# Patient Record
Sex: MALE | Race: WHITE | NOT HISPANIC OR LATINO | Employment: UNEMPLOYED | URBAN - METROPOLITAN AREA
[De-identification: names, ages, dates, MRNs, and addresses within clinical notes are randomized per-mention and may not be internally consistent; named-entity substitution may affect disease eponyms.]

---

## 2022-01-27 ENCOUNTER — HOSPITAL ENCOUNTER (INPATIENT)
Facility: CLINIC | Age: 32
LOS: 1 days | Discharge: LEFT BEFORE DISCHARGE | End: 2022-01-28
Attending: EMERGENCY MEDICINE | Admitting: INTERNAL MEDICINE
Payer: COMMERCIAL

## 2022-01-27 DIAGNOSIS — F10.931 ALCOHOL WITHDRAWAL, WITH DELIRIUM (H): ICD-10-CM

## 2022-01-27 DIAGNOSIS — K70.10 ALCOHOLIC HEPATITIS WITHOUT ASCITES (H): ICD-10-CM

## 2022-01-27 DIAGNOSIS — E86.0 DEHYDRATION: ICD-10-CM

## 2022-01-27 LAB
ALBUMIN SERPL-MCNC: 4.7 G/DL (ref 3.4–5)
ALP SERPL-CCNC: 112 U/L (ref 40–150)
ALT SERPL W P-5'-P-CCNC: 212 U/L (ref 0–70)
ANION GAP SERPL CALCULATED.3IONS-SCNC: 18 MMOL/L (ref 3–14)
AST SERPL W P-5'-P-CCNC: 239 U/L (ref 0–45)
BASOPHILS # BLD MANUAL: 0.1 10E3/UL (ref 0–0.2)
BASOPHILS NFR BLD MANUAL: 1 %
BILIRUB SERPL-MCNC: 1.2 MG/DL (ref 0.2–1.3)
BUN SERPL-MCNC: 14 MG/DL (ref 7–30)
CALCIUM SERPL-MCNC: 10.8 MG/DL (ref 8.5–10.1)
CHLORIDE BLD-SCNC: 90 MMOL/L (ref 94–109)
CO2 SERPL-SCNC: 25 MMOL/L (ref 20–32)
CREAT SERPL-MCNC: 0.64 MG/DL (ref 0.66–1.25)
EOSINOPHIL # BLD MANUAL: 0.5 10E3/UL (ref 0–0.7)
EOSINOPHIL NFR BLD MANUAL: 5 %
ERYTHROCYTE [DISTWIDTH] IN BLOOD BY AUTOMATED COUNT: 13.5 % (ref 10–15)
FLUAV RNA SPEC QL NAA+PROBE: NEGATIVE
FLUBV RNA RESP QL NAA+PROBE: NEGATIVE
GFR SERPL CREATININE-BSD FRML MDRD: >90 ML/MIN/1.73M2
GLUCOSE BLD-MCNC: 131 MG/DL (ref 70–99)
HCT VFR BLD AUTO: 51.3 % (ref 40–53)
HGB BLD-MCNC: 17.9 G/DL (ref 13.3–17.7)
HOLD SPECIMEN: NORMAL
HOLD SPECIMEN: NORMAL
LIPASE SERPL-CCNC: 168 U/L (ref 73–393)
LYMPHOCYTES # BLD MANUAL: 0.5 10E3/UL (ref 0.8–5.3)
LYMPHOCYTES NFR BLD MANUAL: 5 %
MCH RBC QN AUTO: 33.6 PG (ref 26.5–33)
MCHC RBC AUTO-ENTMCNC: 34.9 G/DL (ref 31.5–36.5)
MCV RBC AUTO: 96 FL (ref 78–100)
MONOCYTES # BLD MANUAL: 0.4 10E3/UL (ref 0–1.3)
MONOCYTES NFR BLD MANUAL: 4 %
NEUTROPHILS # BLD MANUAL: 7.7 10E3/UL (ref 1.6–8.3)
NEUTROPHILS NFR BLD MANUAL: 85 %
PLAT MORPH BLD: ABNORMAL
PLATELET # BLD AUTO: 92 10E3/UL (ref 150–450)
POTASSIUM BLD-SCNC: 4.2 MMOL/L (ref 3.4–5.3)
PROT SERPL-MCNC: 9.5 G/DL (ref 6.8–8.8)
RBC # BLD AUTO: 5.33 10E6/UL (ref 4.4–5.9)
RBC MORPH BLD: ABNORMAL
SARS-COV-2 RNA RESP QL NAA+PROBE: NEGATIVE
SODIUM SERPL-SCNC: 133 MMOL/L (ref 133–144)
WBC # BLD AUTO: 9 10E3/UL (ref 4–11)

## 2022-01-27 PROCEDURE — 87636 SARSCOV2 & INF A&B AMP PRB: CPT | Performed by: EMERGENCY MEDICINE

## 2022-01-27 PROCEDURE — 96361 HYDRATE IV INFUSION ADD-ON: CPT

## 2022-01-27 PROCEDURE — 83735 ASSAY OF MAGNESIUM: CPT | Performed by: INTERNAL MEDICINE

## 2022-01-27 PROCEDURE — 36415 COLL VENOUS BLD VENIPUNCTURE: CPT | Performed by: EMERGENCY MEDICINE

## 2022-01-27 PROCEDURE — 85027 COMPLETE CBC AUTOMATED: CPT | Performed by: EMERGENCY MEDICINE

## 2022-01-27 PROCEDURE — 80053 COMPREHEN METABOLIC PANEL: CPT | Performed by: EMERGENCY MEDICINE

## 2022-01-27 PROCEDURE — 93005 ELECTROCARDIOGRAM TRACING: CPT

## 2022-01-27 PROCEDURE — 120N000001 HC R&B MED SURG/OB

## 2022-01-27 PROCEDURE — 82040 ASSAY OF SERUM ALBUMIN: CPT | Performed by: EMERGENCY MEDICINE

## 2022-01-27 PROCEDURE — 96376 TX/PRO/DX INJ SAME DRUG ADON: CPT

## 2022-01-27 PROCEDURE — 258N000003 HC RX IP 258 OP 636: Performed by: EMERGENCY MEDICINE

## 2022-01-27 PROCEDURE — C9803 HOPD COVID-19 SPEC COLLECT: HCPCS

## 2022-01-27 PROCEDURE — 99223 1ST HOSP IP/OBS HIGH 75: CPT | Mod: AI | Performed by: INTERNAL MEDICINE

## 2022-01-27 PROCEDURE — 250N000011 HC RX IP 250 OP 636: Performed by: EMERGENCY MEDICINE

## 2022-01-27 PROCEDURE — 83690 ASSAY OF LIPASE: CPT | Performed by: EMERGENCY MEDICINE

## 2022-01-27 PROCEDURE — 99285 EMERGENCY DEPT VISIT HI MDM: CPT | Mod: 25

## 2022-01-27 PROCEDURE — 84100 ASSAY OF PHOSPHORUS: CPT | Performed by: INTERNAL MEDICINE

## 2022-01-27 PROCEDURE — 96374 THER/PROPH/DIAG INJ IV PUSH: CPT

## 2022-01-27 RX ORDER — LORAZEPAM 2 MG/ML
3 INJECTION INTRAMUSCULAR ONCE
Status: COMPLETED | OUTPATIENT
Start: 2022-01-27 | End: 2022-01-27

## 2022-01-27 RX ORDER — SODIUM CHLORIDE 9 MG/ML
INJECTION, SOLUTION INTRAVENOUS CONTINUOUS
Status: DISCONTINUED | OUTPATIENT
Start: 2022-01-27 | End: 2022-01-28 | Stop reason: HOSPADM

## 2022-01-27 RX ORDER — SODIUM CHLORIDE 9 MG/ML
INJECTION, SOLUTION INTRAVENOUS CONTINUOUS
Status: DISCONTINUED | OUTPATIENT
Start: 2022-01-28 | End: 2022-01-28 | Stop reason: HOSPADM

## 2022-01-27 RX ORDER — LORAZEPAM 2 MG/ML
2 INJECTION INTRAMUSCULAR ONCE
Status: COMPLETED | OUTPATIENT
Start: 2022-01-27 | End: 2022-01-27

## 2022-01-27 RX ADMIN — SODIUM CHLORIDE: 9 INJECTION, SOLUTION INTRAVENOUS at 23:03

## 2022-01-27 RX ADMIN — LORAZEPAM 3 MG: 2 INJECTION INTRAMUSCULAR; INTRAVENOUS at 23:10

## 2022-01-27 RX ADMIN — LORAZEPAM 2 MG: 2 INJECTION INTRAMUSCULAR; INTRAVENOUS at 21:42

## 2022-01-27 RX ADMIN — SODIUM CHLORIDE 1000 ML: 9 INJECTION, SOLUTION INTRAVENOUS at 21:40

## 2022-01-27 RX ADMIN — SODIUM CHLORIDE 1000 ML: 9 INJECTION, SOLUTION INTRAVENOUS at 23:04

## 2022-01-27 ASSESSMENT — ACTIVITIES OF DAILY LIVING (ADL): ADLS_ACUITY_SCORE: 12

## 2022-01-27 ASSESSMENT — ENCOUNTER SYMPTOMS
SHORTNESS OF BREATH: 0
FEVER: 0
TREMORS: 1
VOMITING: 1

## 2022-01-27 ASSESSMENT — MIFFLIN-ST. JEOR: SCORE: 1515.11

## 2022-01-28 VITALS
WEIGHT: 125.6 LBS | DIASTOLIC BLOOD PRESSURE: 78 MMHG | BODY MASS INDEX: 20.18 KG/M2 | SYSTOLIC BLOOD PRESSURE: 136 MMHG | OXYGEN SATURATION: 93 % | RESPIRATION RATE: 16 BRPM | TEMPERATURE: 98.5 F | HEIGHT: 66 IN | HEART RATE: 94 BPM

## 2022-01-28 LAB
ALBUMIN SERPL-MCNC: 3.2 G/DL (ref 3.4–5)
ALP SERPL-CCNC: 78 U/L (ref 40–150)
ALT SERPL W P-5'-P-CCNC: 136 U/L (ref 0–70)
ANION GAP SERPL CALCULATED.3IONS-SCNC: 8 MMOL/L (ref 3–14)
AST SERPL W P-5'-P-CCNC: 133 U/L (ref 0–45)
ATRIAL RATE - MUSE: 124 BPM
BILIRUB SERPL-MCNC: 1.2 MG/DL (ref 0.2–1.3)
BUN SERPL-MCNC: 12 MG/DL (ref 7–30)
CALCIUM SERPL-MCNC: 8.7 MG/DL (ref 8.5–10.1)
CHLORIDE BLD-SCNC: 102 MMOL/L (ref 94–109)
CO2 SERPL-SCNC: 25 MMOL/L (ref 20–32)
CREAT SERPL-MCNC: 0.58 MG/DL (ref 0.66–1.25)
DIASTOLIC BLOOD PRESSURE - MUSE: NORMAL MMHG
ERYTHROCYTE [DISTWIDTH] IN BLOOD BY AUTOMATED COUNT: 13.6 % (ref 10–15)
GFR SERPL CREATININE-BSD FRML MDRD: >90 ML/MIN/1.73M2
GLUCOSE BLD-MCNC: 83 MG/DL (ref 70–99)
HCT VFR BLD AUTO: 41.7 % (ref 40–53)
HGB BLD-MCNC: 14.2 G/DL (ref 13.3–17.7)
INTERPRETATION ECG - MUSE: NORMAL
KETONES BLD-SCNC: 1.1 MMOL/L (ref 0–0.6)
LACTATE SERPL-SCNC: 1.4 MMOL/L (ref 0.7–2)
MAGNESIUM SERPL-MCNC: 1.8 MG/DL (ref 1.6–2.3)
MAGNESIUM SERPL-MCNC: 1.9 MG/DL (ref 1.6–2.3)
MCH RBC QN AUTO: 32.9 PG (ref 26.5–33)
MCHC RBC AUTO-ENTMCNC: 34.1 G/DL (ref 31.5–36.5)
MCV RBC AUTO: 97 FL (ref 78–100)
P AXIS - MUSE: 69 DEGREES
PHOSPHATE SERPL-MCNC: 4 MG/DL (ref 2.5–4.5)
PLATELET # BLD AUTO: 59 10E3/UL (ref 150–450)
POTASSIUM BLD-SCNC: 3.7 MMOL/L (ref 3.4–5.3)
POTASSIUM BLD-SCNC: 4 MMOL/L (ref 3.4–5.3)
PR INTERVAL - MUSE: 118 MS
PROT SERPL-MCNC: 6.8 G/DL (ref 6.8–8.8)
QRS DURATION - MUSE: 84 MS
QT - MUSE: 336 MS
QTC - MUSE: 482 MS
R AXIS - MUSE: 85 DEGREES
RBC # BLD AUTO: 4.32 10E6/UL (ref 4.4–5.9)
SODIUM SERPL-SCNC: 135 MMOL/L (ref 133–144)
SYSTOLIC BLOOD PRESSURE - MUSE: NORMAL MMHG
T AXIS - MUSE: -37 DEGREES
VENTRICULAR RATE- MUSE: 124 BPM
WBC # BLD AUTO: 4.6 10E3/UL (ref 4–11)

## 2022-01-28 PROCEDURE — 82010 KETONE BODYS QUAN: CPT | Performed by: INTERNAL MEDICINE

## 2022-01-28 PROCEDURE — 120N000001 HC R&B MED SURG/OB

## 2022-01-28 PROCEDURE — 84132 ASSAY OF SERUM POTASSIUM: CPT | Performed by: INTERNAL MEDICINE

## 2022-01-28 PROCEDURE — 36415 COLL VENOUS BLD VENIPUNCTURE: CPT | Performed by: INTERNAL MEDICINE

## 2022-01-28 PROCEDURE — 83605 ASSAY OF LACTIC ACID: CPT | Performed by: INTERNAL MEDICINE

## 2022-01-28 PROCEDURE — 250N000009 HC RX 250: Performed by: INTERNAL MEDICINE

## 2022-01-28 PROCEDURE — 85027 COMPLETE CBC AUTOMATED: CPT | Performed by: INTERNAL MEDICINE

## 2022-01-28 PROCEDURE — 83735 ASSAY OF MAGNESIUM: CPT | Performed by: INTERNAL MEDICINE

## 2022-01-28 PROCEDURE — 250N000011 HC RX IP 250 OP 636: Performed by: INTERNAL MEDICINE

## 2022-01-28 PROCEDURE — 250N000013 HC RX MED GY IP 250 OP 250 PS 637: Performed by: INTERNAL MEDICINE

## 2022-01-28 PROCEDURE — 258N000003 HC RX IP 258 OP 636: Performed by: INTERNAL MEDICINE

## 2022-01-28 RX ORDER — FLUMAZENIL 0.1 MG/ML
0.2 INJECTION, SOLUTION INTRAVENOUS
Status: DISCONTINUED | OUTPATIENT
Start: 2022-01-28 | End: 2022-01-28 | Stop reason: HOSPADM

## 2022-01-28 RX ORDER — HALOPERIDOL 5 MG/ML
2.5-5 INJECTION INTRAMUSCULAR EVERY 6 HOURS PRN
Status: DISCONTINUED | OUTPATIENT
Start: 2022-01-28 | End: 2022-01-28 | Stop reason: HOSPADM

## 2022-01-28 RX ORDER — ONDANSETRON 4 MG/1
4 TABLET, ORALLY DISINTEGRATING ORAL EVERY 6 HOURS PRN
Status: DISCONTINUED | OUTPATIENT
Start: 2022-01-28 | End: 2022-01-28 | Stop reason: HOSPADM

## 2022-01-28 RX ORDER — ACETAMINOPHEN 325 MG/1
650 TABLET ORAL EVERY 6 HOURS PRN
Status: DISCONTINUED | OUTPATIENT
Start: 2022-01-28 | End: 2022-01-28 | Stop reason: HOSPADM

## 2022-01-28 RX ORDER — MULTIPLE VITAMINS W/ MINERALS TAB 9MG-400MCG
1 TAB ORAL DAILY
Status: DISCONTINUED | OUTPATIENT
Start: 2022-01-28 | End: 2022-01-28 | Stop reason: HOSPADM

## 2022-01-28 RX ORDER — FOLIC ACID 1 MG/1
1 TABLET ORAL DAILY
Status: DISCONTINUED | OUTPATIENT
Start: 2022-01-28 | End: 2022-01-28 | Stop reason: HOSPADM

## 2022-01-28 RX ORDER — PROCHLORPERAZINE 25 MG
25 SUPPOSITORY, RECTAL RECTAL EVERY 12 HOURS PRN
Status: DISCONTINUED | OUTPATIENT
Start: 2022-01-28 | End: 2022-01-28 | Stop reason: HOSPADM

## 2022-01-28 RX ORDER — GABAPENTIN 300 MG/1
900 CAPSULE ORAL EVERY 8 HOURS
Status: DISCONTINUED | OUTPATIENT
Start: 2022-01-28 | End: 2022-01-28 | Stop reason: HOSPADM

## 2022-01-28 RX ORDER — CLONIDINE HYDROCHLORIDE 0.1 MG/1
0.1 TABLET ORAL EVERY 8 HOURS
Status: DISCONTINUED | OUTPATIENT
Start: 2022-01-28 | End: 2022-01-28 | Stop reason: HOSPADM

## 2022-01-28 RX ORDER — GABAPENTIN 300 MG/1
600 CAPSULE ORAL EVERY 8 HOURS
Status: DISCONTINUED | OUTPATIENT
Start: 2022-01-31 | End: 2022-01-28 | Stop reason: HOSPADM

## 2022-01-28 RX ORDER — LORAZEPAM 1 MG/1
1-2 TABLET ORAL EVERY 30 MIN PRN
Status: DISCONTINUED | OUTPATIENT
Start: 2022-01-28 | End: 2022-01-28 | Stop reason: HOSPADM

## 2022-01-28 RX ORDER — GABAPENTIN 300 MG/1
300 CAPSULE ORAL EVERY 8 HOURS
Status: DISCONTINUED | OUTPATIENT
Start: 2022-02-02 | End: 2022-01-28 | Stop reason: HOSPADM

## 2022-01-28 RX ORDER — ONDANSETRON 2 MG/ML
4 INJECTION INTRAMUSCULAR; INTRAVENOUS EVERY 6 HOURS PRN
Status: DISCONTINUED | OUTPATIENT
Start: 2022-01-28 | End: 2022-01-28 | Stop reason: HOSPADM

## 2022-01-28 RX ORDER — ACETAMINOPHEN 650 MG/1
650 SUPPOSITORY RECTAL EVERY 6 HOURS PRN
Status: DISCONTINUED | OUTPATIENT
Start: 2022-01-28 | End: 2022-01-28 | Stop reason: HOSPADM

## 2022-01-28 RX ORDER — LORAZEPAM 2 MG/ML
1-2 INJECTION INTRAMUSCULAR EVERY 30 MIN PRN
Status: DISCONTINUED | OUTPATIENT
Start: 2022-01-28 | End: 2022-01-28 | Stop reason: HOSPADM

## 2022-01-28 RX ORDER — LIDOCAINE 40 MG/G
CREAM TOPICAL
Status: DISCONTINUED | OUTPATIENT
Start: 2022-01-28 | End: 2022-01-28 | Stop reason: HOSPADM

## 2022-01-28 RX ORDER — CALCIUM CARBONATE 500 MG/1
1000 TABLET, CHEWABLE ORAL 4 TIMES DAILY PRN
Status: DISCONTINUED | OUTPATIENT
Start: 2022-01-28 | End: 2022-01-28 | Stop reason: HOSPADM

## 2022-01-28 RX ORDER — DOCUSATE SODIUM 100 MG/1
100 CAPSULE, LIQUID FILLED ORAL 2 TIMES DAILY
Status: DISCONTINUED | OUTPATIENT
Start: 2022-01-28 | End: 2022-01-28 | Stop reason: HOSPADM

## 2022-01-28 RX ORDER — PROCHLORPERAZINE MALEATE 10 MG
10 TABLET ORAL EVERY 6 HOURS PRN
Status: DISCONTINUED | OUTPATIENT
Start: 2022-01-28 | End: 2022-01-28 | Stop reason: HOSPADM

## 2022-01-28 RX ORDER — OLANZAPINE 5 MG/1
5-10 TABLET, ORALLY DISINTEGRATING ORAL EVERY 6 HOURS PRN
Status: DISCONTINUED | OUTPATIENT
Start: 2022-01-28 | End: 2022-01-28 | Stop reason: HOSPADM

## 2022-01-28 RX ORDER — GABAPENTIN 100 MG/1
100 CAPSULE ORAL EVERY 8 HOURS
Status: DISCONTINUED | OUTPATIENT
Start: 2022-02-04 | End: 2022-01-28 | Stop reason: HOSPADM

## 2022-01-28 RX ADMIN — FOLIC ACID: 5 INJECTION, SOLUTION INTRAMUSCULAR; INTRAVENOUS; SUBCUTANEOUS at 01:03

## 2022-01-28 RX ADMIN — CLONIDINE HYDROCHLORIDE 0.1 MG: 0.1 TABLET ORAL at 01:00

## 2022-01-28 RX ADMIN — GABAPENTIN 900 MG: 300 CAPSULE ORAL at 06:59

## 2022-01-28 ASSESSMENT — ACTIVITIES OF DAILY LIVING (ADL)
ADLS_ACUITY_SCORE: 8
ADLS_ACUITY_SCORE: 8
ADLS_ACUITY_SCORE: 12
ADLS_ACUITY_SCORE: 8
ADLS_ACUITY_SCORE: 6
ADLS_ACUITY_SCORE: 8

## 2022-01-28 ASSESSMENT — MIFFLIN-ST. JEOR: SCORE: 1472.47

## 2022-01-28 NOTE — DISCHARGE SUMMARY
Notified that patient eloped     Bed alarm sounded and apparently he has left before anyone saw him.  His gown and IV materials were on the bed/floor, his belongings were gone and he was no where in the room.  A search of the floor was also made and still not found      Left message on cell number to please return to the ER if concerning symptoms arose

## 2022-01-28 NOTE — PROVIDER NOTIFICATION
Paged dr landa: RN responded to bed alarm going off and found pt missing, IV removed and belongings gone. looked for him on the unit but unable to locate him.     8:28 AM MD received message, nothing to be done.

## 2022-01-28 NOTE — PLAN OF CARE
Temp: 98.5  F (36.9  C) Temp src: Oral BP: 136/78 Pulse: 94   Resp: 16 SpO2: 93 % O2 Device: None (Room air)     Pt unsteady gait upon admission to floor. Drowsy. Admitted for ETOH withdraw. Last drink was 2 nights ago. CIWA 5/2. L. PIV infusing banana bag. Run NS continuous infusion afterwards. Continue to monitor.

## 2022-01-28 NOTE — ED PROVIDER NOTES
"  History     Chief Complaint:  Withdrawal      HPI   Merrill Chang is a 31 year old male who presents with withdrawal symptoms. The patient woke up did not feel good, and has vomited throughout the day. He is tremulous, and has hot and cold flashes as well. He has no fever, chest pain, shortness of breath, or loss of taste or smell. He denies abdominal pain. He last drank alcohol last night, and drinks about 12 beers a day.      Review of Systems   Constitutional: Negative for fever.        Hot and cold flashes   HENT:        No loss of taste or smell   Respiratory: Negative for shortness of breath.    Cardiovascular: Negative for chest pain.   Gastrointestinal: Positive for vomiting.   Neurological: Positive for tremors.   All other systems reviewed and are negative.      Allergies:  Latex    Medications:    Adderall    Past Medical History:    Psychosis  Paranoia  ADD    Social History:  Patient presents to the ED alone  Patient drinks alcohol    Physical Exam     Patient Vitals for the past 24 hrs:   BP Temp Temp src Pulse Resp SpO2 Height Weight   01/27/22 2245 (!) 145/88 -- -- (!) 126 21 94 % -- --   01/27/22 2241 (!) 140/93 -- -- (!) 128 18 95 % -- --   01/27/22 2215 (!) 155/96 -- -- -- 17 94 % -- --   01/27/22 2200 (!) 148/97 -- -- -- 22 93 % -- --   01/27/22 2145 (!) 127/107 -- -- 117 22 94 % -- --   01/27/22 2130 (!) 150/120 -- -- 120 22 95 % -- --   01/27/22 2116 (!) 169/83 97.7  F (36.5  C) Oral (!) 136 20 99 % 1.676 m (5' 6\") 61.2 kg (135 lb)       Physical Exam  Constitutional: Patient is anxious, tremulous.  Head: Atraumatic.  Mouth/Throat: Oropharynx is clear and moist. No oropharyngeal exudate.  Eyes: Conjunctivae and EOM are normal. No scleral icterus.  Neck: Normal range of motion. Neck supple.   Cardiovascular:  intact distal pulses, tachycardic.  Pulmonary/Chest: Breath sounds normal. No respiratory distress.  Abdominal: Soft. Bowel sounds are normal. No distension. No tenderness. No rebound " or guarding.   Musculoskeletal: Normal range of motion. No edema or tenderness.   Neurological: Alert and orientated to person, place, and time. No observable focal neuro deficit  Skin: Warm and dry. No rash noted. Not diaphoretic.     Emergency Department Course   ECG:  ECG taken at 2146, ECG read at 2204  Sinus tachycardia   Possible left atrial enlargement  Marked ST abnormality, possible inferior subendocardial injury   Rate 124 bpm. VT interval 118 ms. QRS duration 84 ms. QT/QTc 336/482 ms. P-R-T axes 69 85 -37.     Laboratory:  Labs Ordered and Resulted from Time of ED Arrival to Time of ED Departure   COMPREHENSIVE METABOLIC PANEL - Abnormal       Result Value    Sodium 133      Potassium 4.2      Chloride 90 (*)     Carbon Dioxide (CO2) 25      Anion Gap 18 (*)     Urea Nitrogen 14      Creatinine 0.64 (*)     Calcium 10.8 (*)     Glucose 131 (*)     Alkaline Phosphatase 112       (*)      (*)     Protein Total 9.5 (*)     Albumin 4.7      Bilirubin Total 1.2      GFR Estimate >90     CBC WITH PLATELETS AND DIFFERENTIAL - Abnormal    WBC Count 9.0      RBC Count 5.33      Hemoglobin 17.9 (*)     Hematocrit 51.3      MCV 96      MCH 33.6 (*)     MCHC 34.9      RDW 13.5      Platelet Count 92 (*)    DIFFERENTIAL - Abnormal    % Neutrophils 85      % Lymphocytes 5      % Monocytes 4      % Eosinophils 5      % Basophils 1      Absolute Neutrophils 7.7      Absolute Lymphocytes 0.5 (*)     Absolute Monocytes 0.4      Absolute Eosinophils 0.5      Absolute Basophils 0.1      RBC Morphology Confirmed RBC Indices      Platelet Assessment        Value: Automated Count Confirmed. Platelet morphology is normal.   LIPASE - Normal    Lipase 168     INFLUENZA A/B & SARS-COV2 PCR MULTIPLEX         Emergency Department Course:    Reviewed:  I reviewed nursing notes, vitals, past history and care everywhere    Assessments:  2132 I obtained history and examined the patient as noted above.   I rechecked the  patient and explained findings.       Consults:   7091 I spoke with Dr. Swanson  to discuss the patient's findings, presentation, and plan of care.      Interventions:  2140 NS 1000 mL IV  2142 Lorazepam 2 mg IV    Disposition:  The patient was admitted to the hospital under the care of Dr. Swanson.    Impression & Plan      Medical Decision Making:  Merrill Chang is a 31 year old male who presents for evaluation of alcohol abuse.  He is in wihtdrawal here in ED on exam.  He appears on exam to be going through acute alcohol withdrawal.  We discussed this and decided on treatment w/ benzodiazepines, see medications given in ED above. There are no signs of co-ingestion including acetaminophen, drugs, medications, volatile alcohols. He has no signs of trauma related to alcohol use and no further workup is needed including head CT. Admit to medicine for further cares given history, tachycardia and withdrawal here. Likely will be a prolonged withdrawal course given this and do not feel patient can safely manage at home nor a more ambulatory setting like detox.       Covid-19  Merrill Chang was evaluated during a global COVID-19 pandemic, which necessitated consideration that the patient might be at risk for infection with the SARS-CoV-2 virus that causes COVID-19.   Applicable protocols for evaluation were followed during the patient's care.   COVID-19 was considered as part of the patient's evaluation. The plan for testing is:  a test was obtained during this visit. Test result is negative.    Diagnosis:    ICD-10-CM    1. Alcohol withdrawal, with delirium (H)  F10.231    2. Alcoholic hepatitis without ascites  K70.10    3. Dehydration  E86.0          Scribe Disclosure:  Bobbi MALLOY Hired, am serving as a scribe at 9:20 PM on 1/27/2022 to document services personally performed by Joaquin Espinoza MD based on my observations and the provider's statements to me.      Joaquin Espinoza MD  01/27/22  1923       Joaquin Espinoza MD  01/27/22 [id number]

## 2022-01-28 NOTE — ED NOTES
[facility]  ED Nurse Handoff Report    Merrill Chang is a 31 year old male   ED Chief complaint: Withdrawal  . ED Diagnosis:   Final diagnoses:   Alcohol withdrawal, with delirium (H)     Allergies:   Allergies   Allergen Reactions     Latex Rash       Code Status: Full Code  Activity level - Baseline/Home:  Independent. Activity Level - Current:   Stand by Assist. Lift room needed: No. Bariatric: No   Needed: No   Isolation: No. Infection: Not Applicable.     Vital Signs:   Vitals:    01/27/22 2145 01/27/22 2200 01/27/22 2215 01/27/22 2241   BP: (!) 127/107 (!) 148/97 (!) 155/96 (!) 140/93   Pulse: 117   (!) 128   Resp: 22 22 17 18   Temp:       TempSrc:       SpO2: 94% 93% 94% 95%   Weight:       Height:           Cardiac Rhythm:  ,      Pain level:    Patient confused: No. Patient Falls Risk: No.   Elimination Status: Has voided   Patient Report - Initial Complaint:Pt presents for evaluation of alcohol withdrawal. Pt drinks a few beers a day. Last drink was yesterday. Has been throwing up all day. Worried about dehydration. Tremulous in triage. Also notes chills and sweats. Denies drug use.  No hx of withdrawal.  . Focused Assessment:   Cardiac Cardiac - Cardiac WDL: .WDL except; rhythm; heart sounds (denies any chest pain) Cardiac Rhythm: tachycardic   Cardiac Monitoring - EKG Monitoring: Yes  LS       21:52 MHCD Psychiatric Symptoms / Stressors - Mood/Behavior: anxious   Chemical Abuse/Addictions - Last Use:: 01/26/22  Other Use:  (denies all substance use besides alcohol ) Withdrawal Symptoms: Tremors; Nausea   CIWA-Ar - Nausea and Vomiting: mild nausea with no vomiting  Tactile Disturbances: none  Tremor: 3  Auditory Disturbances: not present  Paroxysmal Sweats: 2  Visual Disturbances: not present  Anxiety: mildly anxious  Headache, Fullness in Head: none present  Agitation: moderately fidgety and restless  Orientation and Clouding of Sensorium: oriented and can do serial  additions  CIWA-Ar Total: 11  LS     21:52 Neurological [place] Coma Scale - Best Eye Response: 4-->(E4) spontaneous  Best Motor Response: 6-->(M6) obeys commands  Best Verbal Response: 5-->(V5) oriented  Michael Coma Scale Score: 15          Tests Performed: Labs. Abnormal Results:   Labs Ordered and Resulted from Time of ED Arrival to Time of ED Departure   CBC WITH PLATELETS AND DIFFERENTIAL - Abnormal       Result Value    WBC Count 9.0      RBC Count 5.33      Hemoglobin 17.9 (*)     Hematocrit 51.3      MCV 96      MCH 33.6 (*)     MCHC 34.9      RDW 13.5      Platelet Count 92 (*)    COMPREHENSIVE METABOLIC PANEL   LIPASE   INFLUENZA A/B & SARS-COV2 PCR MULTIPLEX   .   Treatments provided: See MAR  Family Comments: None at bedside  OBS brochure/video discussed/provided to patient:  N/A  ED Medications:   Medications   0.9% sodium chloride BOLUS (1,000 mLs Intravenous New Bag 1/27/22 2140)     Followed by   sodium chloride 0.9% infusion (has no administration in time range)   LORazepam (ATIVAN) injection 2 mg (2 mg Intravenous Given 1/27/22 2142)     Drips infusing:  No  For the majority of the shift, the patient's behavior Green. Interventions performed were N/A.    Sepsis treatment initiated: No     Patient tested for COVID 19 prior to admission: YES    ED Nurse Name/Phone Number: Sendy Dudley RN,   10:53 PM  RECEIVING UNIT ED HANDOFF REVIEW    RECEIVING UNIT ED HANDOFF REVIEW    Above ED Nurse Handoff Report was reviewed: Yes  Reviewed by: Osmin Bear RN on January 28, 2022 at 12:07 AM

## 2022-01-28 NOTE — H&P
[facility]  Hospitalist Admission Note  Name: Merrill Chang    MRN: [id number]  YOB: 1990    Age: 31 year old  Date of admission: 1/27/2022  Primary care provider: No Ref-Primary, Physician    Chief Complaint: Alcohol withdrawal, dehydration    Merrill Chang is a 31 year old male with PMH including tobacco dependence, alcohol abuse, ADHD who presents with nausea, vomiting and tachycardia with tremulousness.  He reports that he woke up today feeling unwell.  He has had multiple bouts of nonbloody vomiting. He reports he does drink about 12 beers per day and his last drink was around 24 hours ago.     Here in the ER he was tachycardic but otherwise hemodynamically stable.  Lab work-up was notable for sodium of 133, slightly elevated anion gap of 18, alkaline phosphatase of 112, AST of 239, ALT of 212, protein of 9.5 and bilirubin of 1.2.  CBC was notable for hemoglobin of 17.9.    Assessment and Plan:   1. Acute alcohol withdrawal: Presents with tremulousness and tachycardia.  Denies having gone through withdrawal in the past.  Drinks about 12 beers per day.  --Admit under inpatient status  --Start gabapentin withdrawal protocol  --CIWA protocol with Ativan  --Vitamins  --Consider CD consult when clinically more appropriate    2.   Dehydration: Possibly related to alcohol induced hepatitis versus GI illness versus other viral process.  Has evidence of hemoconcentration on his CBC and also hypercalcemia which is likely related to dehydration.  --Normal saline bolus, continue normal saline at 125 cc/h  --Advance diet as tolerated, clear liquids to regular    3.   Sinus tachycardia: Suspect primarily related to withdrawal and dehydration at this time.  --Influenza and Covid swabs negative    4.   Elevated transaminases: Suspect alcohol induced though not in a classic 2-1 pattern.  Continue IV hydration and recheck in the morning.  No abdominal pain currently and denies fevers though  has felt periodically warm and cold.  --Recheck in the morning    5.   Metabolic acidosis: Suspect starvation ketosis, though may have an elevated lactate as well in the setting of severe dehydration.  --He thinks he may be able to advance his diet now  --Check ketone level and lactic acid.  If ketones are dramatically elevated would certainly consider adding dextrose to his IV fluids.    DVT Prophylaxis: Pneumatic Compression Devices  Code Status: Full Code  Discharge Dispo: Admit under inpatient status      History of Present Illness:  Merrill Chang is a 31 year old male with PMH including tobacco dependence, alcohol abuse, ADHD who presents with nausea, vomiting and tachycardia with tremulousness.  He reports that he woke up today feeling unwell.  He has had multiple bouts of nonbloody vomiting.  He has felt somewhat flushed and chilled periodically.  He denies any abdominal pain, cough, urinary changes or diarrhea.  He reports he does drink about 12 beers per day and his last drink was around 24 hours ago.  He is not sure if he is interested in quitting or not.  He does not think he is gone through withdrawal before.    Here in the ER he was tachycardic but otherwise hemodynamically stable.  Lab work-up was notable for sodium of 133, slightly elevated anion gap of 18, alkaline phosphatase of 112, AST of 239, ALT of 212, protein of 9.5 and bilirubin of 1.2.  CBC was notable for hemoglobin of 17.9.       Past Medical History:  Alcohol abuse  Tobacco abuse  ADHD    Past Surgical History:  Viewed and noncontributory    Social History:  Smokes about 1 pack/day.  Lives in [place].  Drinks about 12 beers per day.  Denies other drug use.  Social History     Tobacco Use     Smoking status: Not on file     Smokeless tobacco: Not on file   Substance Use Topics     Alcohol use: Yes     Social History     Social History Narrative     Not on file     Family History:  Denies pertinent family history    Allergies:  Allergies  "  Allergen Reactions     Latex Rash     Medications:  Adderall    Review of Systems:  A Comprehensive greater than 10 system review of systems was carried out.  Pertinent positives and negatives are noted above.  Otherwise negative for contributory information.     Physical Exam:  Blood pressure (!) 151/92, pulse (!) 129, temperature 97.7  F (36.5  C), temperature source Oral, resp. rate 18, height 1.676 m (5' 6\"), weight 61.2 kg (135 lb), SpO2 95 %.  Wt Readings from Last 1 Encounters:   01/27/22 61.2 kg (135 lb)     Exam:  General: Thin man lying in bed, awake and alert.  HEENT: NC/AT, eyes anicteric, external occular movements intact, face symmetric.    Cardiac: Tachycardic, regular  Pulmonary: Normal chest rise, normal work of breathing.   Abdomen: soft, non-tender, non-distended.  Bowel Sounds Present.  No guarding.  Extremities: no deformities.  Warm, well perfused.  Skin: no rashes or lesions noted.  Warm and Dry.  Neuro: Tremulous.  No focal deficits, alert and oriented x4.  Speech clear.  Psych: Calm    Data:  EKG: Sinus tachycardia  Imaging:  Results for orders placed or performed in visit on 05/06/08   CHEST X-RAY 2 VW    Impression       CHEST TWO VIEW*      HISTORY: Dyspnea     FINDINGS: Negative.   CT SCAN CHEST    Impression       EXAM:  CT CHEST W/ CONTRAST*      HISTORY:    Shortness of breath and wheezing. Evaluate for pulmonary  embolus.       TECHNIQUE:  100 mL Optiray.  Axial images with coronal  reconstructions.     COMPARISON:  None.     FINDINGS: The pulmonary arteries are well opacified. No CT evidence  for pulmonary embolus. No aortic dissection.     There are scattered mild areas of groundglass infiltrate in both  lungs, nonspecific. No focal dense infiltrates. Mildly enlarged right  hilar and AP window lymph nodes. Mediastinal and hilar structures are  otherwise within normal limits for age.      Visualized portions of the upper abdominal organs are within normal  limits.                  "                IMPRESSION:   1. No pulmonary embolus or aortic dissection.  2. Nonspecific patchy scattered bilateral ground glass infiltrates.  These may represent an atypical pneumonia. Mildly enlarged right hilar  and AP window lymph nodes are likely reactive.     Labs:  Recent Labs   Lab 01/27/22 2133   WBC 9.0   HGB 17.9*   HCT 51.3   MCV 96   PLT 92*          Lab Results   Component Value Date     01/27/2022     06/02/2019     05/06/2008    Lab Results   Component Value Date    CHLORIDE 90 01/27/2022    CHLORIDE 109 06/02/2019    CHLORIDE 104 05/06/2008    Lab Results   Component Value Date    BUN 14 01/27/2022    BUN 9 06/02/2019    BUN 7 05/06/2008      Lab Results   Component Value Date    POTASSIUM 4.2 01/27/2022    POTASSIUM 4.0 06/02/2019    POTASSIUM 3.8 05/06/2008    Lab Results   Component Value Date    CO2 25 01/27/2022    CO2 20 06/02/2019    CO2 26 05/06/2008    Lab Results   Component Value Date    CR 0.64 01/27/2022    CR 0.85 06/02/2019    CR 0.72 05/06/2008        Recent Labs   Lab 01/27/22 2132   *   *   ALKPHOS 112   BILITOTAL 1.2     No results for input(s): INR in the last 168 hours.  No results for input(s): LACT in the last 168 hours.      Travis Swanson MD  Hospitalist  [facility]

## 2022-01-28 NOTE — ED TRIAGE NOTES
Pt presents for evaluation of alcohol withdrawal. Pt drinks a few beers a day. Last drink was yesterday. Has been throwing up all day. Worried about dehydration. Tremulous in triage. Also notes chills and sweats. Denies drug use.  No hx of withdrawal.